# Patient Record
Sex: MALE | Race: WHITE | NOT HISPANIC OR LATINO | Employment: OTHER | URBAN - NONMETROPOLITAN AREA
[De-identification: names, ages, dates, MRNs, and addresses within clinical notes are randomized per-mention and may not be internally consistent; named-entity substitution may affect disease eponyms.]

---

## 2018-01-30 ENCOUNTER — TELEPHONE (OUTPATIENT)
Dept: PSYCHIATRY | Facility: CLINIC | Age: 48
End: 2018-01-30

## 2018-01-30 ENCOUNTER — OFFICE VISIT (OUTPATIENT)
Dept: PSYCHIATRY | Facility: CLINIC | Age: 48
End: 2018-01-30

## 2018-01-30 VITALS
SYSTOLIC BLOOD PRESSURE: 117 MMHG | HEIGHT: 70 IN | WEIGHT: 214 LBS | HEART RATE: 75 BPM | BODY MASS INDEX: 30.64 KG/M2 | DIASTOLIC BLOOD PRESSURE: 73 MMHG

## 2018-01-30 DIAGNOSIS — Z79.899 MEDICATION MANAGEMENT: ICD-10-CM

## 2018-01-30 DIAGNOSIS — F43.0 STRESS REACTION: ICD-10-CM

## 2018-01-30 DIAGNOSIS — F43.23 ADJUSTMENT DISORDER WITH MIXED ANXIETY AND DEPRESSED MOOD: Primary | ICD-10-CM

## 2018-01-30 LAB
AMPHETAMINE CUT-OFF: 1000
BENZODIAZIPINE CUT-OFF: 300
BUPRENORPHINE CUT-OFF: 10
COCAINE CUT-OFF: 300
EXTERNAL AMPHETAMINE SCREEN URINE: NEGATIVE
EXTERNAL BENZODIAZEPINE SCREEN URINE: POSITIVE
EXTERNAL BUPRENORPHINE SCREEN URINE: NEGATIVE
EXTERNAL COCAINE SCREEN URINE: NEGATIVE
EXTERNAL MDMA: NEGATIVE
EXTERNAL METHADONE SCREEN URINE: NEGATIVE
EXTERNAL METHAMPHETAMINE SCREEN URINE: NEGATIVE
EXTERNAL OPIATES SCREEN URINE: NEGATIVE
EXTERNAL OXYCODONE SCREEN URINE: NEGATIVE
EXTERNAL THC SCREEN URINE: NEGATIVE
MDMA CUT-OFF: 500
METHADONE CUT-OFF: 300
METHAMPHETAMINE CUT-OFF: 1000
OPIATES CUT-OFF: 300
OXYCODONE CUT-OFF: 100
THC CUT-OFF: 50

## 2018-01-30 PROCEDURE — 90792 PSYCH DIAG EVAL W/MED SRVCS: CPT | Performed by: NURSE PRACTITIONER

## 2018-01-30 RX ORDER — BUSPIRONE HYDROCHLORIDE 10 MG/1
10 TABLET ORAL
Qty: 60 TABLET | Refills: 0 | Status: SHIPPED | OUTPATIENT
Start: 2018-01-30 | End: 2020-02-03

## 2018-01-30 RX ORDER — TRAZODONE HYDROCHLORIDE 50 MG/1
50 TABLET ORAL NIGHTLY PRN
Qty: 30 TABLET | Refills: 1 | Status: SHIPPED | OUTPATIENT
Start: 2018-01-30 | End: 2020-02-03

## 2018-01-30 RX ORDER — HYDROXYZINE HYDROCHLORIDE 25 MG/1
25 TABLET, FILM COATED ORAL 3 TIMES DAILY PRN
Qty: 90 TABLET | Refills: 0 | Status: SHIPPED | OUTPATIENT
Start: 2018-01-30 | End: 2020-02-03

## 2018-01-30 RX ORDER — CITALOPRAM 20 MG/1
TABLET ORAL
Qty: 30 TABLET | Refills: 0 | Status: SHIPPED | OUTPATIENT
Start: 2018-01-30 | End: 2020-02-03

## 2018-01-30 RX ORDER — ESCITALOPRAM OXALATE 5 MG/1
5 TABLET ORAL DAILY
COMMUNITY
End: 2018-01-30
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
## 2020-02-03 ENCOUNTER — OFFICE VISIT (OUTPATIENT)
Dept: INTERNAL MEDICINE | Facility: CLINIC | Age: 50
End: 2020-02-03

## 2020-02-03 VITALS
WEIGHT: 207 LBS | BODY MASS INDEX: 30.66 KG/M2 | DIASTOLIC BLOOD PRESSURE: 52 MMHG | HEIGHT: 69 IN | HEART RATE: 72 BPM | SYSTOLIC BLOOD PRESSURE: 98 MMHG

## 2020-02-03 DIAGNOSIS — Z11.3 SCREEN FOR STD (SEXUALLY TRANSMITTED DISEASE): ICD-10-CM

## 2020-02-03 DIAGNOSIS — R10.13 EPIGASTRIC PAIN: Primary | ICD-10-CM

## 2020-02-03 DIAGNOSIS — F32.A ANXIETY AND DEPRESSION: ICD-10-CM

## 2020-02-03 DIAGNOSIS — R94.31 ABNORMAL EKG: ICD-10-CM

## 2020-02-03 DIAGNOSIS — F19.10 SUBSTANCE ABUSE (HCC): ICD-10-CM

## 2020-02-03 DIAGNOSIS — F41.9 ANXIETY AND DEPRESSION: ICD-10-CM

## 2020-02-03 DIAGNOSIS — Z13.220 LIPID SCREENING: ICD-10-CM

## 2020-02-03 DIAGNOSIS — Z13.29 ENCOUNTER FOR SCREENING FOR ENDOCRINE DISORDER: ICD-10-CM

## 2020-02-03 PROCEDURE — 99214 OFFICE O/P EST MOD 30 MIN: CPT | Performed by: NURSE PRACTITIONER

## 2020-02-03 PROCEDURE — 93000 ELECTROCARDIOGRAM COMPLETE: CPT | Performed by: NURSE PRACTITIONER

## 2020-02-03 NOTE — PROGRESS NOTES
Saud Limon  1970  [id number]  Patient Care Team:  Ban Vance APRN as PCP - General (Internal Medicine)    Saud Limon is a 49 y.o. here today to establish care.     Chief Complaint   Patient presents with   • Establish Care       HPI:   Saud is here to establish care.  Reports no pcp for 2-3 years now secondary to lack of insurance.   His primary concern is epigastric pain.  Pain is persistent lasting the past few years, worse after eating fried foods and large amounts at one time.  Sometimes with vomiting.  Uses baking soda in water sometimes.  Has night sweats at times, no otc antacids.    [place] 2017    Also wants to address anxiety and depression.  Worried about decreased concentration and focus.    He denies current illicit drug use but has been using narcotics (percocet) and benzos (xanax) intermittently for years.  Rarely uses etoh.  Reports he went through divorce several yrs ago and separation from his teenage children has been difficult.  Last cocaine use 6 mo ago.    Years ago he was given xanax and at one time around 2008 was prescribed 120 per mo.  He reports this was difficult to taper down from but was eventually successful.     Currently using percocet avg 2 per day, usually 10 mg but sometimes 20mg for the past 4 mo.    He would like to update labs and check for STDs.  Past Medical History:   Diagnosis Date   • Anxiety    • Depression    • Insomnia      Past Surgical History:   Procedure Laterality Date   • NO PAST SURGERIES       Family History   Family history unknown: Yes     Social History     Tobacco Use   Smoking Status Light Tobacco Smoker   Smokeless Tobacco Former User   Tobacco Comment    Patient smokes 2-3 cigarettes per day on occasion     No Known Allergies  No current outpatient medications on file.    Review of Systems   Constitutional: Positive for fatigue. Negative for chills and fever.   HENT: Negative for congestion, ear pain and sinus pressure.    Respiratory: Positive  "for wheezing. Negative for cough, chest tightness and shortness of breath.    Cardiovascular: Positive for chest pain. Negative for palpitations.   Gastrointestinal: Positive for abdominal pain. Negative for blood in stool and constipation.   Skin: Negative for color change.   Allergic/Immunologic: Negative for environmental allergies.   Neurological: Negative for dizziness, speech difficulty and headache.   Psychiatric/Behavioral: Positive for decreased concentration. The patient is nervous/anxious.        BP 98/52 (BP Location: Left arm, Patient Position: Sitting, Cuff Size: Large Adult)   Pulse 72   Ht 176.5 cm (69.49\")   Wt 93.9 kg (207 lb)   BMI 30.14 kg/m²     Physical Exam   Constitutional: He appears well-developed and well-nourished.   HENT:   Head: Normocephalic and atraumatic.   Right Ear: External ear normal.   Left Ear: External ear normal.   Mouth/Throat: Oropharynx is clear and moist.   Eyes: Conjunctivae and EOM are normal.   Neck: Normal range of motion. Neck supple.   Cardiovascular: Normal rate, regular rhythm and normal heart sounds.   Pulmonary/Chest: Effort normal and breath sounds normal.   Abdominal: Soft. Bowel sounds are normal. There is tenderness in the epigastric area.   Musculoskeletal: Normal range of motion.   Neurological: He is alert.   Skin: Skin is warm and dry.   Psychiatric: He has a normal mood and affect. His behavior is normal. Thought content normal.         ECG 12 Lead  Date/Time: 2/3/2020 12:58 PM  Performed by: Ban Vance APRN  Authorized by: Ban Vance APRN   Previous ECG: no previous ECG available  Rhythm: sinus rhythm  BPM: 67  Conduction: non-specific intraventricular conduction delay    Clinical impression: abnormal EKG  Comments: HX of substance abuse including cocaine            Results Review:  I reviewed the patient's new clinical results.    Assessment/Plan:  Patient Instructions   Problem List Items Addressed This Visit     None      Visit " Diagnoses     Epigastric pain    -  Primary    EKG today, will get labs    Relevant Orders    Hepatitis C Antibody    ECG 12 Lead    Substance abuse (CMS/HCC)        percocet, hx of xanax and cocaine    Relevant Orders    CBC & Differential    Urine Drug Screen - Urine, Clean Catch (Completed)    Vitamin B12    Lipid screening        Relevant Orders    Lipid Panel    Comprehensive Metabolic Panel    Encounter for screening for endocrine disorder        Relevant Orders    TSH Rfx On Abnormal To Free T4    Screen for STD (sexually transmitted disease)        Relevant Orders    HIV-1 & HIV-2 Antibodies    RPR    Hepatitis C Antibody    Chlamydia trachomatis, Neisseria gonorrhoeae, PCR - Urine, Urine, Clean Catch             Diagnosis Plan   1. Epigastric pain  Hepatitis C Antibody    ECG 12 Lead    EKG today, will get labs   2. Substance abuse (CMS/HCC)  CBC & Differential    Urine Drug Screen - Urine, Clean Catch    Vitamin B12    percocet, hx of xanax and cocaine   3. Lipid screening  Lipid Panel    Comprehensive Metabolic Panel   4. Encounter for screening for endocrine disorder  TSH Rfx On Abnormal To Free T4   5. Screen for STD (sexually transmitted disease)  HIV-1 & HIV-2 Antibodies    RPR    Hepatitis C Antibody    Chlamydia trachomatis, Neisseria gonorrhoeae, PCR - Urine, Urine, Clean Catch       Patient Instructions   Labs today.      Will need referral to mental health provider for current narcotic dependence.  He agrees to check with insurance for options.  Suggest [facility] if they accept insurance.    Will refer to cardiology for evaluation of abnormal EKG and history of cocaine abuse.          Plan of care reviewed with patient at the conclusion of today's visit. Education was provided regarding diagnosis and management.  Patient verbalizes understanding of and agreement with management plan.    Return in about 2 weeks (around 2/17/2020).    * Please note that portions of this note were  completed with a voice recognition program. Efforts were made to edit the dictation but occasionally words are transcribed.     Ban Vance, APRN

## 2020-02-04 ENCOUNTER — LAB (OUTPATIENT)
Dept: LAB | Facility: HOSPITAL | Age: 50
End: 2020-02-04

## 2020-02-04 DIAGNOSIS — R10.13 EPIGASTRIC PAIN: ICD-10-CM

## 2020-02-04 DIAGNOSIS — Z11.3 SCREEN FOR STD (SEXUALLY TRANSMITTED DISEASE): ICD-10-CM

## 2020-02-04 DIAGNOSIS — Z13.220 LIPID SCREENING: ICD-10-CM

## 2020-02-04 DIAGNOSIS — F19.10 SUBSTANCE ABUSE (HCC): ICD-10-CM

## 2020-02-04 DIAGNOSIS — Z13.29 ENCOUNTER FOR SCREENING FOR ENDOCRINE DISORDER: ICD-10-CM

## 2020-02-04 LAB
ALBUMIN SERPL-MCNC: 4.7 G/DL (ref 3.5–5.2)
ALBUMIN/GLOB SERPL: 2.1 G/DL
ALP SERPL-CCNC: 75 U/L (ref 39–117)
ALT SERPL W P-5'-P-CCNC: 19 U/L (ref 1–41)
AMPHET+METHAMPHET UR QL: NEGATIVE
AMPHETAMINES UR QL: NEGATIVE
ANION GAP SERPL CALCULATED.3IONS-SCNC: 13 MMOL/L (ref 5–15)
AST SERPL-CCNC: 18 U/L (ref 1–40)
BARBITURATES UR QL SCN: NEGATIVE
BASOPHILS # BLD AUTO: 0.06 10*3/MM3 (ref 0–0.2)
BASOPHILS NFR BLD AUTO: 0.9 % (ref 0–1.5)
BENZODIAZ UR QL SCN: NEGATIVE
BILIRUB SERPL-MCNC: 0.7 MG/DL (ref 0.2–1.2)
BUN BLD-MCNC: 15 MG/DL (ref 6–20)
BUN/CREAT SERPL: 15 (ref 7–25)
BUPRENORPHINE SERPL-MCNC: NEGATIVE NG/ML
CALCIUM SPEC-SCNC: 9.5 MG/DL (ref 8.6–10.5)
CANNABINOIDS SERPL QL: NEGATIVE
CHLORIDE SERPL-SCNC: 101 MMOL/L (ref 98–107)
CHOLEST SERPL-MCNC: 173 MG/DL (ref 0–200)
CO2 SERPL-SCNC: 25 MMOL/L (ref 22–29)
COCAINE UR QL: POSITIVE
CREAT BLD-MCNC: 1 MG/DL (ref 0.76–1.27)
DEPRECATED RDW RBC AUTO: 39.7 FL (ref 37–54)
EOSINOPHIL # BLD AUTO: 0.19 10*3/MM3 (ref 0–0.4)
EOSINOPHIL NFR BLD AUTO: 2.8 % (ref 0.3–6.2)
ERYTHROCYTE [DISTWIDTH] IN BLOOD BY AUTOMATED COUNT: 12 % (ref 12.3–15.4)
GFR SERPL CREATININE-BSD FRML MDRD: 80 ML/MIN/1.73
GLOBULIN UR ELPH-MCNC: 2.2 GM/DL
GLUCOSE BLD-MCNC: 104 MG/DL (ref 65–99)
HCT VFR BLD AUTO: 44 % (ref 37.5–51)
HCV AB SER DONR QL: NORMAL
HDLC SERPL-MCNC: 42 MG/DL (ref 40–60)
HGB BLD-MCNC: 15.3 G/DL (ref 13–17.7)
HIV1+2 AB SER QL: NORMAL
IMM GRANULOCYTES # BLD AUTO: 0.02 10*3/MM3 (ref 0–0.05)
IMM GRANULOCYTES NFR BLD AUTO: 0.3 % (ref 0–0.5)
LDLC SERPL CALC-MCNC: 105 MG/DL (ref 0–100)
LDLC/HDLC SERPL: 2.49 {RATIO}
LYMPHOCYTES # BLD AUTO: 2.1 10*3/MM3 (ref 0.7–3.1)
LYMPHOCYTES NFR BLD AUTO: 30.7 % (ref 19.6–45.3)
MCH RBC QN AUTO: 31.7 PG (ref 26.6–33)
MCHC RBC AUTO-ENTMCNC: 34.8 G/DL (ref 31.5–35.7)
MCV RBC AUTO: 91.1 FL (ref 79–97)
METHADONE UR QL SCN: NEGATIVE
MONOCYTES # BLD AUTO: 0.49 10*3/MM3 (ref 0.1–0.9)
MONOCYTES NFR BLD AUTO: 7.2 % (ref 5–12)
NEUTROPHILS # BLD AUTO: 3.99 10*3/MM3 (ref 1.7–7)
NEUTROPHILS NFR BLD AUTO: 58.1 % (ref 42.7–76)
NRBC BLD AUTO-RTO: 0 /100 WBC (ref 0–0.2)
OPIATES UR QL: NEGATIVE
OXYCODONE UR QL SCN: POSITIVE
PCP UR QL SCN: NEGATIVE
PLATELET # BLD AUTO: 210 10*3/MM3 (ref 140–450)
PMV BLD AUTO: 10.8 FL (ref 6–12)
POTASSIUM BLD-SCNC: 4.6 MMOL/L (ref 3.5–5.2)
PROPOXYPH UR QL: NEGATIVE
PROT SERPL-MCNC: 6.9 G/DL (ref 6–8.5)
RBC # BLD AUTO: 4.83 10*6/MM3 (ref 4.14–5.8)
SODIUM BLD-SCNC: 139 MMOL/L (ref 136–145)
TRICYCLICS UR QL SCN: NEGATIVE
TRIGL SERPL-MCNC: 132 MG/DL (ref 0–150)
TSH SERPL DL<=0.05 MIU/L-ACNC: 1.57 UIU/ML (ref 0.27–4.2)
VIT B12 BLD-MCNC: 466 PG/ML (ref 211–946)
VLDLC SERPL-MCNC: 26.4 MG/DL (ref 5–40)
WBC NRBC COR # BLD: 6.85 10*3/MM3 (ref 3.4–10.8)

## 2020-02-04 PROCEDURE — 80061 LIPID PANEL: CPT

## 2020-02-04 PROCEDURE — 80306 DRUG TEST PRSMV INSTRMNT: CPT

## 2020-02-04 PROCEDURE — 87591 N.GONORRHOEAE DNA AMP PROB: CPT

## 2020-02-04 PROCEDURE — 82607 VITAMIN B-12: CPT

## 2020-02-04 PROCEDURE — G0432 EIA HIV-1/HIV-2 SCREEN: HCPCS

## 2020-02-04 PROCEDURE — 85025 COMPLETE CBC W/AUTO DIFF WBC: CPT

## 2020-02-04 PROCEDURE — 80053 COMPREHEN METABOLIC PANEL: CPT

## 2020-02-04 PROCEDURE — 86592 SYPHILIS TEST NON-TREP QUAL: CPT

## 2020-02-04 PROCEDURE — 87491 CHLMYD TRACH DNA AMP PROBE: CPT

## 2020-02-04 PROCEDURE — 86803 HEPATITIS C AB TEST: CPT

## 2020-02-04 PROCEDURE — 84443 ASSAY THYROID STIM HORMONE: CPT

## 2020-02-05 LAB — RPR SER QL: NORMAL

## 2020-02-05 NOTE — PATIENT INSTRUCTIONS
Labs today.      Will need referral to mental health provider for current narcotic dependence.  He agrees to check with insurance for options.  Suggest [facility] if they accept insurance.    Will refer to cardiology for evaluation of abnormal EKG and history of cocaine abuse.

## 2020-02-05 NOTE — PROGRESS NOTES
"[facility]  [facility]      02/06/2020       Saud SARAH [id number]  [unfilled]    1970    Ban Vance APRN    Saud Limon is a 49 y.o. male.      Subjective:     Chief Complaint:  Establish Care- abnormal EKG       HPI   49-year-old male with no significant past medical history who presents to the [facility] for evaluation of abnormal EKG at the request of JONO Sheldon.  Patient reports that he is not had any medical care for the past 2 or 3 years secondary to lack of insurance.  He recently established care with JONO Sheldon and complained of epigastric pain over the past few years.  He occasionally has some associated vomiting.  Pain is worse after eating fried foods and large amounts of food.  He had an EKG done in her office which showed left axis deviation, moderate intraventricular conduction delay and possible LVH.  He does have a history of cocaine use, last use about a year. He reports waking up with panic attacks and heart pounding and then chest soreness. He has been exercising, boxing, and notes feeling some \"numb and cold\" sensation in his chest. He notes constant chest pressure at rest that is worse when he breaths out. He currently has chest pressure. He does work a strenuous job and he can do this without symptoms at time. He admits that anxiety is major contributor    Cardiac risks: Male gender, tobacco abuse        Past Medical History:   Diagnosis Date   • Anxiety    • Depression    • Insomnia        Past Surgical History:   Procedure Laterality Date   • CHOLECYSTECTOMY     • NO PAST SURGERIES         Family History   Problem Relation Age of Onset   • No Known Problems Mother    • No Known Problems Father    • No Known Problems Sister        Social History     Socioeconomic History   • Marital status: Other     Spouse name: Not on file   • Number of children: Not on file   • Years of education: Not " on file   • Highest education level: Not on file   Tobacco Use   • Smoking status: Light Tobacco Smoker   • Smokeless tobacco: Former User   • Tobacco comment: Patient smokes 2-3 cigarettes per day on occasion   Substance and Sexual Activity   • Alcohol use: No   • Drug use: No   • Sexual activity: Not Currently   Social History Narrative    Caffeine 1 serving of coffee       No Known Allergies      Current Outpatient Medications:   •  busPIRone (BUSPAR) 10 MG tablet, Take 10 mg by mouth 3 (Three) Times a Day., Disp: , Rfl:     The following portions of the patient's history were reviewed today and updated as appropriate: allergies, current medications, past family history, past medical history, past social history, past surgical history and problem list     Review of Systems   Constitution: Negative for chills and fever.   HENT: Negative.    Eyes: Negative.    Cardiovascular: Positive for chest pain and dyspnea on exertion. Negative for claudication, cyanosis, irregular heartbeat, leg swelling, near-syncope, orthopnea, palpitations, paroxysmal nocturnal dyspnea and syncope.   Respiratory: Negative for cough, shortness of breath and snoring.    Endocrine: Negative.    Hematologic/Lymphatic: Does not bruise/bleed easily.   Skin: Negative for poor wound healing.   Musculoskeletal: Negative.    Gastrointestinal: Negative for abdominal pain, heartburn, hematemesis, melena, nausea and vomiting.   Genitourinary: Negative.  Negative for hematuria.   Neurological: Negative.    Psychiatric/Behavioral: The patient is nervous/anxious.    Allergic/Immunologic: Negative.        Objective:     Vitals:    02/06/20 1110 02/06/20 1111 02/06/20 1112   BP: 101/63 111/72 110/68   BP Location: Right arm Left arm Left arm   Patient Position: Sitting Standing Sitting   Cuff Size: Adult Adult Adult   Pulse: 77 84 75   Resp:   18   Temp:   98 °F (36.7 °C)   TempSrc:   Temporal   SpO2: 96% 96% 98%   Weight:   95.4 kg (210 lb 6 oz)   Height:   " 176.5 cm (69.49\")       Body mass index is 30.63 kg/m².    Physical Exam   Constitutional: He is oriented to person, place, and time. He appears well-developed and well-nourished. No distress.   HENT:   Head: Normocephalic.   Eyes: Pupils are equal, round, and reactive to light. Conjunctivae are normal.   Neck: Neck supple. No JVD present. No thyromegaly present.   Cardiovascular: Normal rate, regular rhythm, normal heart sounds and intact distal pulses. Exam reveals no gallop and no friction rub.   No murmur heard.  Pulmonary/Chest: Effort normal and breath sounds normal. No respiratory distress. He has no wheezes. He has no rales. He exhibits no tenderness.   Abdominal: Soft. Bowel sounds are normal.   Musculoskeletal: Normal range of motion. He exhibits no edema.   Neurological: He is alert and oriented to person, place, and time.   Skin: Skin is warm and dry.   Psychiatric: He has a normal mood and affect. His behavior is normal. Thought content normal.   Vitals reviewed.      Lab and Diagnostic Review:  Results for orders placed or performed in visit on 02/04/20   Lipid Panel   Result Value Ref Range    Total Cholesterol 173 0 - 200 mg/dL    Triglycerides 132 0 - 150 mg/dL    HDL Cholesterol 42 40 - 60 mg/dL    LDL Cholesterol  105 (H) 0 - 100 mg/dL    VLDL Cholesterol 26.4 5 - 40 mg/dL    LDL/HDL Ratio 2.49    Comprehensive Metabolic Panel   Result Value Ref Range    Glucose 104 (H) 65 - 99 mg/dL    BUN 15 6 - 20 mg/dL    Creatinine 1.00 0.76 - 1.27 mg/dL    Sodium 139 136 - 145 mmol/L    Potassium 4.6 3.5 - 5.2 mmol/L    Chloride 101 98 - 107 mmol/L    CO2 25.0 22.0 - 29.0 mmol/L    Calcium 9.5 8.6 - 10.5 mg/dL    Total Protein 6.9 6.0 - 8.5 g/dL    Albumin 4.70 3.50 - 5.20 g/dL    ALT (SGPT) 19 1 - 41 U/L    AST (SGOT) 18 1 - 40 U/L    Alkaline Phosphatase 75 39 - 117 U/L    Total Bilirubin 0.7 0.2 - 1.2 mg/dL    eGFR Non African Amer 80 >60 mL/min/1.73    Globulin 2.2 gm/dL    A/G Ratio 2.1 g/dL    " BUN/Creatinine Ratio 15.0 7.0 - 25.0    Anion Gap 13.0 5.0 - 15.0 mmol/L   TSH Rfx On Abnormal To Free T4   Result Value Ref Range    TSH 1.570 0.270 - 4.200 uIU/mL   Urine Drug Screen - Urine, Clean Catch   Result Value Ref Range    THC, Screen, Urine Negative Negative    Phencyclidine (PCP), Urine Negative Negative    Cocaine Screen, Urine Positive (A) Negative    Methamphetamine, Ur Negative Negative    Opiate Screen Negative Negative    Amphetamine Screen, Urine Negative Negative    Benzodiazepine Screen, Urine Negative Negative    Tricyclic Antidepressants Screen Negative Negative    Methadone Screen, Urine Negative Negative    Barbiturates Screen, Urine Negative Negative    Oxycodone Screen, Urine Positive (A) Negative    Propoxyphene Screen Negative Negative    Buprenorphine, Screen, Urine Negative Negative   Vitamin B12   Result Value Ref Range    Vitamin B-12 466 211 - 946 pg/mL   RPR   Result Value Ref Range    RPR Non-Reactive Non-Reactive   Hepatitis C Antibody   Result Value Ref Range    Hepatitis C Ab Non-Reactive Non-Reactive   CBC Auto Differential   Result Value Ref Range    WBC 6.85 3.40 - 10.80 10*3/mm3    RBC 4.83 4.14 - 5.80 10*6/mm3    Hemoglobin 15.3 13.0 - 17.7 g/dL    Hematocrit 44.0 37.5 - 51.0 %    MCV 91.1 79.0 - 97.0 fL    MCH 31.7 26.6 - 33.0 pg    MCHC 34.8 31.5 - 35.7 g/dL    RDW 12.0 (L) 12.3 - 15.4 %    RDW-SD 39.7 37.0 - 54.0 fl    MPV 10.8 6.0 - 12.0 fL    Platelets 210 140 - 450 10*3/mm3    Neutrophil % 58.1 42.7 - 76.0 %    Lymphocyte % 30.7 19.6 - 45.3 %    Monocyte % 7.2 5.0 - 12.0 %    Eosinophil % 2.8 0.3 - 6.2 %    Basophil % 0.9 0.0 - 1.5 %    Immature Grans % 0.3 0.0 - 0.5 %    Neutrophils, Absolute 3.99 1.70 - 7.00 10*3/mm3    Lymphocytes, Absolute 2.10 0.70 - 3.10 10*3/mm3    Monocytes, Absolute 0.49 0.10 - 0.90 10*3/mm3    Eosinophils, Absolute 0.19 0.00 - 0.40 10*3/mm3    Basophils, Absolute 0.06 0.00 - 0.20 10*3/mm3    Immature Grans, Absolute 0.02 0.00 - 0.05  10*3/mm3    nRBC 0.0 0.0 - 0.2 /100 WBC   HIV-1 / O / 2 Ag / Antibody 4th Generation   Result Value Ref Range    HIV-1/ HIV-2 Non-Reactive Non-Reactive     EKG 2/3/20 NSR, marked LAD, moderate intraventricular conduction delay, possible LVH    Assessment and Plan:   1. Chest pain, unspecified type  Some atypical symptoms but borderline EKG and exertional symptoms  Advised to not exercise until stress test results  Smoking and cocaine cessation encouraged  - Treadmill Stress Test; Future    2. Abnormal EKG  - Treadmill Stress Test; Future  - Adult Transthoracic Echo Complete W/ Cont if Necessary Per Protocol; Future    3. BRANDON (dyspnea on exertion)  - Treadmill Stress Test; Future  - Adult Transthoracic Echo Complete W/ Cont if Necessary Per Protocol; Future    Further plans pending testing      It has been a pleasure to participate in the care of this patient.  Patient was instructed to call the [facility] with any questions, concerns, or worsening symptoms.    *Please note that portions of this note were completed with a voice recognition program. Efforts were made to edit the dictations, but occasionally words are mistranscribed.

## 2020-02-06 ENCOUNTER — HOSPITAL ENCOUNTER (OUTPATIENT)
Dept: CARDIOLOGY | Facility: HOSPITAL | Age: 50
Discharge: HOME | End: 2020-02-06
Admitting: NURSE PRACTITIONER

## 2020-02-06 ENCOUNTER — OFFICE VISIT (OUTPATIENT)
Dept: CARDIOLOGY | Facility: HOSPITAL | Age: 50
End: 2020-02-06

## 2020-02-06 VITALS
SYSTOLIC BLOOD PRESSURE: 110 MMHG | TEMPERATURE: 98 F | BODY MASS INDEX: 31.16 KG/M2 | WEIGHT: 210.38 LBS | RESPIRATION RATE: 18 BRPM | OXYGEN SATURATION: 98 % | HEIGHT: 69 IN | DIASTOLIC BLOOD PRESSURE: 68 MMHG | HEART RATE: 75 BPM

## 2020-02-06 DIAGNOSIS — R07.9 CHEST PAIN, UNSPECIFIED TYPE: Primary | ICD-10-CM

## 2020-02-06 DIAGNOSIS — R94.31 ABNORMAL EKG: ICD-10-CM

## 2020-02-06 DIAGNOSIS — R06.09 DOE (DYSPNEA ON EXERTION): ICD-10-CM

## 2020-02-06 DIAGNOSIS — R07.9 CHEST PAIN, UNSPECIFIED TYPE: ICD-10-CM

## 2020-02-06 LAB
BH CV STRESS BP STAGE 1: NORMAL
BH CV STRESS BP STAGE 2: NORMAL
BH CV STRESS BP STAGE 3: NORMAL
BH CV STRESS DURATION MIN STAGE 1: 3
BH CV STRESS DURATION MIN STAGE 2: 3
BH CV STRESS DURATION MIN STAGE 3: 3
BH CV STRESS DURATION SEC STAGE 1: 0
BH CV STRESS DURATION SEC STAGE 2: 0
BH CV STRESS DURATION SEC STAGE 3: 0
BH CV STRESS DURATION SEC STAGE 4: 30
BH CV STRESS GRADE STAGE 1: 10
BH CV STRESS GRADE STAGE 2: 12
BH CV STRESS GRADE STAGE 3: 14
BH CV STRESS GRADE STAGE 4: 16
BH CV STRESS HR STAGE 1: 115
BH CV STRESS HR STAGE 2: 129
BH CV STRESS HR STAGE 3: 147
BH CV STRESS HR STAGE 4: 155
BH CV STRESS METS STAGE 1: 5
BH CV STRESS METS STAGE 2: 7.5
BH CV STRESS METS STAGE 3: 10
BH CV STRESS METS STAGE 4: 13.5
BH CV STRESS PROTOCOL 1: NORMAL
BH CV STRESS RECOVERY BP: NORMAL MMHG
BH CV STRESS RECOVERY HR: 102 BPM
BH CV STRESS SPEED STAGE 1: 1.7
BH CV STRESS SPEED STAGE 2: 2.5
BH CV STRESS SPEED STAGE 3: 3.4
BH CV STRESS SPEED STAGE 4: 4.2
BH CV STRESS STAGE 1: 1
BH CV STRESS STAGE 2: 2
BH CV STRESS STAGE 3: 3
BH CV STRESS STAGE 4: 4
MAXIMAL PREDICTED HEART RATE: 173 BPM
PERCENT MAX PREDICTED HR: 89.6 %
STRESS BASELINE BP: NORMAL MMHG
STRESS BASELINE HR: 85 BPM
STRESS PERCENT HR: 105 %
STRESS POST ESTIMATED WORKLOAD: 10.8 METS
STRESS POST EXERCISE DUR MIN: 9 MIN
STRESS POST EXERCISE DUR SEC: 30 SEC
STRESS POST PEAK BP: NORMAL MMHG
STRESS POST PEAK HR: 155 BPM
STRESS TARGET HR: 147 BPM

## 2020-02-06 PROCEDURE — 93017 CV STRESS TEST TRACING ONLY: CPT

## 2020-02-06 PROCEDURE — 99214 OFFICE O/P EST MOD 30 MIN: CPT | Performed by: NURSE PRACTITIONER

## 2020-02-06 PROCEDURE — 93018 CV STRESS TEST I&R ONLY: CPT | Performed by: INTERNAL MEDICINE

## 2020-02-06 RX ORDER — BUSPIRONE HYDROCHLORIDE 10 MG/1
10 TABLET ORAL 3 TIMES DAILY
COMMUNITY
End: 2020-02-17

## 2020-02-07 LAB
C TRACH RRNA SPEC DONR QL NAA+PROBE: NEGATIVE
N GONORRHOEA DNA SPEC QL NAA+PROBE: NEGATIVE

## 2020-02-17 ENCOUNTER — OFFICE VISIT (OUTPATIENT)
Dept: INTERNAL MEDICINE | Facility: CLINIC | Age: 50
End: 2020-02-17

## 2020-02-17 VITALS
SYSTOLIC BLOOD PRESSURE: 118 MMHG | DIASTOLIC BLOOD PRESSURE: 80 MMHG | HEART RATE: 76 BPM | HEIGHT: 69 IN | WEIGHT: 211 LBS | BODY MASS INDEX: 31.25 KG/M2

## 2020-02-17 DIAGNOSIS — R10.13 EPIGASTRIC PAIN: Primary | ICD-10-CM

## 2020-02-17 DIAGNOSIS — F11.20 NARCOTIC ADDICTION (HCC): ICD-10-CM

## 2020-02-17 DIAGNOSIS — F14.90 COCAINE USE: ICD-10-CM

## 2020-02-17 DIAGNOSIS — F19.10 SUBSTANCE ABUSE (HCC): ICD-10-CM

## 2020-02-17 PROCEDURE — 99214 OFFICE O/P EST MOD 30 MIN: CPT | Performed by: NURSE PRACTITIONER

## 2020-02-17 NOTE — PATIENT INSTRUCTIONS
Keep appointment with behavioral health this Wednesday as planned.    Will need to schedule echo as suggested by cardiology.    Avoid narcotics and illicit drugs including cocaine.    Great job decreasing cigarette smoking.

## 2020-02-17 NOTE — PROGRESS NOTES
Saud Limon  1970  [id number]  Patient Care Team:  Ban Vance APRN as PCP - General (Internal Medicine)    Saud Limon is a pleasant 49 y.o. male who presents for evaluation of Abdominal Pain (3 week follow up )      Chief Complaint   Patient presents with   • Abdominal Pain     3 week follow up        HPI:   Patient had his initial visit with me February 3 to establish care.  He had been without a PCP for many years.  Struggling with a multi-substance addiction including Percocet and cocaine.  He is also been a light cigarette smoker for many years.  He reports that since his last visit 2 weeks ago he has had less narcotics.  Has used 4 Percocets and 2 Lortabs.  Average use was 2 to 4/day prior.  Has used cocaine twice.  He did not get his lab letter so we reviewed that today during office visit.  He does have a behavioral health visit scheduled for this Wednesday.  He did follow-up with cardiology had a negative stress test and pending echo.  The chest wall pain he was having has improved.  He continues to have some abdominal pain.  No fever.  I will hold off on prescribing medications for mental health disorder since he has a pending appointment.  He endorses motivation to get clean.  Past Medical History:   Diagnosis Date   • Anxiety    • Depression    • Insomnia      Past Surgical History:   Procedure Laterality Date   • CHOLECYSTECTOMY     • NO PAST SURGERIES       Family History   Problem Relation Age of Onset   • No Known Problems Mother    • No Known Problems Father    • No Known Problems Sister      Social History     Tobacco Use   Smoking Status Light Tobacco Smoker   Smokeless Tobacco Former User   Tobacco Comment    Patient smokes 2-3 cigarettes per day on occasion     No Known Allergies  No current outpatient medications on file.    Review of Systems   Constitutional: Negative for chills, fatigue and fever.   HENT: Negative for congestion, ear pain and sinus pressure.   "  Respiratory: Negative for cough, chest tightness, shortness of breath and wheezing.    Cardiovascular: Negative for chest pain and palpitations.   Gastrointestinal: Negative for abdominal pain, blood in stool and constipation.   Skin: Negative for color change.   Allergic/Immunologic: Negative for environmental allergies.   Neurological: Negative for dizziness, speech difficulty and headache.   Psychiatric/Behavioral: Negative for decreased concentration. The patient is not nervous/anxious.      /80 (BP Location: Left arm, Patient Position: Sitting, Cuff Size: Adult)   Pulse 76   Ht 176.5 cm (69.49\")   Wt 95.7 kg (211 lb)   BMI 30.72 kg/m²     Physical Exam   Constitutional: He appears well-developed and well-nourished.   HENT:   Head: Normocephalic and atraumatic.   Right Ear: External ear normal.   Left Ear: External ear normal.   Mouth/Throat: Oropharynx is clear and moist.   Eyes: Conjunctivae and EOM are normal.   Neck: Normal range of motion. Neck supple.   Cardiovascular: Normal rate, regular rhythm and normal heart sounds.   Pulmonary/Chest: Effort normal and breath sounds normal.   Abdominal: Soft. Bowel sounds are normal.   Musculoskeletal: Normal range of motion.   Neurological: He is alert.   Skin: Skin is warm and dry.   Psychiatric: He has a normal mood and affect. His behavior is normal. Thought content normal.       Results Review:  I reviewed the patient's new clinical results.    Assessment/Plan:  Saud was seen today for abdominal pain.    Diagnoses and all orders for this visit:    Epigastric pain  Comments:  cardiac work up in progress.  needs to abstain from drug abuse    Substance abuse (CMS/[facility])  Comments:  seeing behavioral health wed    Cocaine use    Narcotic addiction (CMS/[facility])       Patient Instructions   Keep appointment with behavioral health this Wednesday as planned.    Will need to schedule echo as suggested by cardiology.    Avoid narcotics and illicit drugs including " cocaine.    Great job decreasing cigarette smoking.      Plan of care reviewed with patient at the conclusion of today's visit. Education was provided regarding diagnosis, management and any prescribed or recommended OTC medications.  Patient verbalizes understanding of and agreement with management plan.    Return in about 1 month (around 3/17/2020).    *Note that portions of this note were completed with a voice recognition program.  Efforts were made to edit the dictation but occasionally words are transcribed.    Ban Vance, JONO

## 2020-03-09 ENCOUNTER — TELEPHONE (OUTPATIENT)
Dept: INTERNAL MEDICINE | Facility: CLINIC | Age: 50
End: 2020-03-09

## 2020-03-09 NOTE — TELEPHONE ENCOUNTER
Patient verbalized understanding.     He would like to be tested. Patient has had some vomiting within the last week. Not everyday. Patient did not complain of any other symptoms.

## 2020-03-09 NOTE — TELEPHONE ENCOUNTER
PT CALLED IN STATED HIS GIRLFRIEND WAS RECENTLY DIAGNOSED WITH HPOLORI AND  WOULD LIKE TO KNOW IN HIS RECENT BLOOD WORK IF HE MAY HAVE GOTTEN TESTED FOR THIS AS WELL PLEASE CALL TO ADVISE        PT CB NUMBER: [phone]

## 2020-03-30 ENCOUNTER — NURSE TRIAGE (OUTPATIENT)
Dept: CALL CENTER | Facility: HOSPITAL | Age: 50
End: 2020-03-30

## 2020-03-30 NOTE — TELEPHONE ENCOUNTER
"    Reason for Disposition  • Information only question and nurse able to answer    Additional Information  • Negative: Nursing judgment  • Negative: Nursing judgment  • Negative: Nursing judgment  • Negative: Nursing judgment    Answer Assessment - Initial Assessment Questions  1. REASON FOR CALL or QUESTION: \"What is your reason for calling today?\" or \"How can I best help you?\" or \"What question do you have that I can help answer?\"      Caller has no specific symptoms and was wanting to be tested for COVID so he would know. Caller instructed he does not meet criteria for testing. He was accepting of that and will follow social distancing and all protocols.    Protocols used: INFORMATION ONLY CALL - NO TRIAGE-ADULT-[place]      "

## 2021-01-27 ENCOUNTER — OFFICE VISIT (OUTPATIENT)
Dept: INTERNAL MEDICINE | Facility: CLINIC | Age: 51
End: 2021-01-27

## 2021-01-27 VITALS
DIASTOLIC BLOOD PRESSURE: 68 MMHG | HEIGHT: 70 IN | TEMPERATURE: 97.1 F | BODY MASS INDEX: 30.64 KG/M2 | WEIGHT: 214 LBS | HEART RATE: 72 BPM | SYSTOLIC BLOOD PRESSURE: 112 MMHG

## 2021-01-27 DIAGNOSIS — F19.10 SUBSTANCE ABUSE (HCC): ICD-10-CM

## 2021-01-27 DIAGNOSIS — R53.82 CHRONIC FATIGUE: ICD-10-CM

## 2021-01-27 DIAGNOSIS — R07.89 CHEST DISCOMFORT: Primary | ICD-10-CM

## 2021-01-27 PROCEDURE — 93000 ELECTROCARDIOGRAM COMPLETE: CPT | Performed by: NURSE PRACTITIONER

## 2021-01-27 PROCEDURE — 99214 OFFICE O/P EST MOD 30 MIN: CPT | Performed by: NURSE PRACTITIONER

## 2021-01-27 PROCEDURE — 90471 IMMUNIZATION ADMIN: CPT | Performed by: NURSE PRACTITIONER

## 2021-01-27 PROCEDURE — 90686 IIV4 VACC NO PRSV 0.5 ML IM: CPT | Performed by: NURSE PRACTITIONER

## 2021-01-27 NOTE — PROGRESS NOTES
Called pt back and let him know that the message he was given was incorrect and he needs to return my call.

## 2021-01-27 NOTE — PROGRESS NOTES
"Saud Limon  1970  [id number]  Patient Care Team:  Ban Vance APRN as PCP - General (Internal Medicine)    Saud Limon is a pleasant 50 y.o. male who presents for evaluation of Heart Problem (Patient would like to follow through with echo) and Labs Only (Would like to check testosterone )      Chief Complaint   Patient presents with   • Heart Problem     Patient would like to follow through with echo   • Labs Only     Would like to check testosterone        HPI:   Substance abuse disorder:  contiued to use cocaine last yr on multiple occassions to cope with stress and fatigue.  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  Last use a few weeks ago.  Stopped narcotics last summer.      Anxiety:  Followed by behavioral health Casandra Guillory.  Xanax 1mg TID and prozac until stopping cold turkey 2 wks ago.  \"just don't want to be on medications\".  Reports side effects of fatigue and wt gain.  Has some sx of heart racing when we lays down at night, some sob worse at night and with activity.  Never followed up last yr for echo after cardiology eval, he would like to now.  No formal exercise until 2 wks ago did 2 boxing classes, had sob he attributes to deconditioning,  daily active with work.      Wants to check testosterone to eval lack of energy.  Past Medical History:   Diagnosis Date   • Anxiety    • Depression    • Insomnia      Past Surgical History:   Procedure Laterality Date   • CHOLECYSTECTOMY     • NO PAST SURGERIES       Family History   Problem Relation Age of Onset   • No Known Problems Mother    • No Known Problems Father    • No Known Problems Sister      Social History     Tobacco Use   Smoking Status Former Smoker   • Packs/day: 0.25   • Types: Cigarettes   • Quit date: 1/27/2018   • Years since quitting: 3.0   Smokeless Tobacco Current User   • Types: Chew   Tobacco Comment    Patient smokes 2-3 cigarettes per day on occasion     No Known Allergies  No current " "outpatient medications on file.    Review of Systems   Constitutional: Negative for chills, fatigue and fever.   HENT: Negative for congestion, ear pain and sinus pressure.    Respiratory: Negative for cough, chest tightness, shortness of breath and wheezing.    Cardiovascular: Positive for palpitations. Negative for chest pain.   Gastrointestinal: Negative for abdominal pain, blood in stool and constipation.   Skin: Negative for color change.   Allergic/Immunologic: Negative for environmental allergies.   Neurological: Negative for dizziness, speech difficulty and headache.   Psychiatric/Behavioral: Negative for decreased concentration. The patient is not nervous/anxious.      /68 (BP Location: Left arm, Patient Position: Sitting, Cuff Size: Large Adult)   Pulse 72   Temp 97.1 °F (36.2 °C) (Temporal)   Ht 177 cm (69.69\")   Wt 97.1 kg (214 lb)   BMI 30.98 kg/m²     Physical Exam  Constitutional:       Appearance: He is well-developed.   HENT:      Head: Normocephalic and atraumatic.      Comments: *wearing mask  Eyes:      Conjunctiva/sclera: Conjunctivae normal.      Pupils: Pupils are equal, round, and reactive to light.   Neck:      Musculoskeletal: Normal range of motion and neck supple.   Cardiovascular:      Rate and Rhythm: Normal rate and regular rhythm.      Pulses: Normal pulses.      Heart sounds: Normal heart sounds.   Pulmonary:      Effort: Pulmonary effort is normal.      Breath sounds: Normal breath sounds.   Musculoskeletal: Normal range of motion.      Right lower leg: No edema.      Left lower leg: No edema.   Skin:     General: Skin is warm and dry.   Neurological:      Mental Status: He is alert and oriented to person, place, and time.   Psychiatric:         Mood and Affect: Mood normal.         Behavior: Behavior normal.         Thought Content: Thought content normal.         Judgment: Judgment normal.           ECG 12 Lead    Date/Time: 1/27/2021 11:33 AM  Performed by: Rajiv" JONO Cevallos  Authorized by: Ban Vance APRN   Comparison: compared with previous ECG from 2/3/2020  Similar to previous ECG  Rhythm: sinus rhythm  Conduction: non-specific intraventricular conduction delay  QRS axis: left  Other findings: left ventricular hypertrophy    Clinical impression: abnormal EKG            Results Review:  I reviewed the patient's new clinical results.    PHQ-9 Total Score: 0    Assessment/Plan:  Diagnoses and all orders for this visit:    1. Chest discomfort (Primary)  Comments:  ekg today similar to last year, will proceed with scheduling echo per cards visit last yr  Orders:  -     ECG 12 Lead  -     CBC & Differential; Future  -     Comprehensive Metabolic Panel; Future  -     Lipid Panel; Future  -     TSH Rfx On Abnormal To Free T4; Future    2. Chronic fatigue  Comments:  due for labs, will return fasting in am  Orders:  -     Testosterone; Future  -     Vitamin B12; Future  -     Vitamin D 25 Hydroxy; Future    3. Substance abuse ([facility])  Comments:  continue visits with therapist Casandra Guillory  Orders:  -     Urine Drug Screen - Urine, Clean Catch; Future    Other orders  -     Fluarix/Fluzone/Afluria Quad>6 Months       Patient Instructions   Continue your visits with Casandra Guillory.  Return for fasting labs  Will proceed with scheduling echo.    Substance Use Disorder  Substance use disorder occurs when a person's repeated use of drugs or alcohol interferes with his or her ability to be productive. This disorder can cause problems with mental and physical health. It can affect your ability to have healthy relationships, and it can keep you from being able to meet your responsibilities at work, home, or school. It can also lead to addiction, which is a condition in which the person cannot stop using the substance consistently for a period of time.  Addiction changes the way the brain works. Because of these changes, addiction is a chronic condition. Substance use  disorder can be mild, moderate, or severe.  The most commonly abused substances include:  · Alcohol.  · Tobacco.  · Marijuana.  · Stimulants, such as cocaine and methamphetamine.  · Hallucinogens, such as LSD and PCP.  · Opioids, such as some prescription pain medicines and heroin.  What are the causes?  This condition may develop due to many complex social, psychological, or physical reasons, such as:  · Stress.  · Abuse.  · Peer pressure.  · Anxiety or depression.  What increases the risk?  This condition is more likely to develop in people who:  · Use substances to cope with stress.  · Have been abused.  · Have a mental health disorder, such as depression.  · Have a family history of substance use disorder.  What are the signs or symptoms?  Symptoms of this condition include:  · Using the substance for longer periods of time or at a higher dosage than what is normal or intended.  · Having a lasting desire to use the substance.  · Being unable to slow down or stop the use of the substance.  · Spending an abnormal amount of time getting the substance, using the substance, or recovering from using the substance.  · Using the substance in a way that interferes with work, school, social activities, and personal relationships.  · Using the substance even after having negative consequences, such as:  ? Health problems.  ? Legal or financial troubles.  ? Job loss.  ? Relationship problems.  · Needing more and more of the substance to get the same effect (developing tolerance).  · Experiencing unpleasant symptoms if you do not use the substance (withdrawal).  · Using the substance to avoid withdrawal symptoms.  How is this diagnosed?  This condition may be diagnosed based on:  · A physical exam.  · Your history of substance use.  · Your symptoms. This includes:  ? How substance use affects your life.  ? Changes in personality, behaviors, and mood.  ? Having at least two symptoms of substance use disorder within a 12-month  period.  ? Health issues related to substance use, such as liver damage, shortness of breath, fatigue, cough, or heart problems.  · Blood or urine tests to screen for alcohol and drugs.  How is this treated?  This condition may be treated by:  · Stopping substance use safely. This may require taking medicines and being closely monitored for several days.  · Taking part in group and individual counseling from mental health providers who help people with substance use disorder.  · Staying at a live-in (residential) treatment center for several days or weeks.  · Attending daily counseling sessions at a treatment center.  · Taking medicine as told by your health care provider:  ? To ease symptoms and prevent complications during withdrawal.  ? To treat other mental health issues, such as depression or anxiety.  ? To block cravings by causing the same effects as the substance.  ? To block the effects of the substance or replace good sensations with unpleasant ones.  · Participating in a support group to share your experience with others who are going through the same thing. These groups are an important part of long-term recovery for many people.  Recovery can be a long process. Many people who undergo treatment start using the substance again after stopping (relapse). If you relapse, that does not mean that treatment will not work.  Follow these instructions at home:    · Take over-the-counter and prescription medicines only as told by your health care provider.  · Do not use any drugs or alcohol.  · Avoid temptations or triggers that you associate with your use of the substance.  · Learn and practice techniques for managing stress.  · Have a plan for vulnerable moments. Get phone numbers of people who are willing to help and who are committed to your recovery.  · Attend support groups on a regular basis. These groups include 12-step programs like Alcoholics Anonymous and Narcotics Anonymous.  · Keep all follow-up  visits as told by your health care providers. This is important. This includes continuing to work with therapists and support groups.  Contact a health care provider if:  · You cannot take your medicines as told.  · Your symptoms get worse.  · You have trouble resisting the urge to use drugs or alcohol.  Get help right away if you:  · Relapse.  · Think that you may have taken too much of a drug. The hotline of the National Poison Control Center is (183) 323-4439.  · Have signs of an overdose. Symptoms include:  ? Chest pain.  ? Confusion.  ? Sleepiness or difficulty staying awake.  ? Slowed breathing.  ? Nausea or vomiting.  ? A seizure.  · Have serious thoughts about hurting yourself or someone else.  Drug overdose is an emergency. Do not wait to see if the symptoms will go away. Get medical help right away. Call your local emergency services (510 in the [place] Do not drive yourself to the hospital.  If you ever feel like you may hurt yourself or others, or have thoughts about taking your own life, get help right away. You can go to your nearest emergency department or call:  · Your local emergency services (510 in the [place]  · A suicide crisis helpline, such as the National Suicide Prevention Lifeline at 1-314.165.8681. This is open 24 hours a day.  Summary  · Substance use disorder occurs when a person's repeated use of drugs or alcohol interferes with his or her ability to be productive.  · Taking part in group and individual counseling from mental health providers is a common treatment for people with substance use disorder.  · Recovery can be a long process. Many people who undergo treatment start using the substance again after stopping (relapse). A relapse does not mean that treatment will not work.  · Attend support groups such as Alcoholics Anonymous and Narcotics Anonymous. These groups are an important part of long-term recovery for many people.  This information is not intended to replace advice given  to you by your health care provider. Make sure you discuss any questions you have with your health care provider.  Document Revised: 04/09/2020 Document Reviewed: 01/29/2019  Elsevier Patient Education © 2020 [employer]      Plan of care reviewed with patient at the conclusion of today's visit. Education was provided regarding diagnosis, management and any prescribed or recommended OTC medications.  Patient verbalizes understanding of and agreement with management plan.    Return in about 6 weeks (around 3/10/2021).    *Note that portions of this note were completed with a voice recognition program.  Efforts were made to edit the dictation but occasionally words are transcribed.    Ban Vance, APRN

## 2021-01-27 NOTE — PROGRESS NOTES
Pt called back and I let him know that he needed to call [facility] at [phone] to schedule the echo.  Pt verbalized and understood the information.

## 2021-01-27 NOTE — PATIENT INSTRUCTIONS
Continue your visits with Casandra Guillory.  Return for fasting labs  Will proceed with scheduling echo.    Substance Use Disorder  Substance use disorder occurs when a person's repeated use of drugs or alcohol interferes with his or her ability to be productive. This disorder can cause problems with mental and physical health. It can affect your ability to have healthy relationships, and it can keep you from being able to meet your responsibilities at work, home, or school. It can also lead to addiction, which is a condition in which the person cannot stop using the substance consistently for a period of time.  Addiction changes the way the brain works. Because of these changes, addiction is a chronic condition. Substance use disorder can be mild, moderate, or severe.  The most commonly abused substances include:  · Alcohol.  · Tobacco.  · Marijuana.  · Stimulants, such as cocaine and methamphetamine.  · Hallucinogens, such as LSD and PCP.  · Opioids, such as some prescription pain medicines and heroin.  What are the causes?  This condition may develop due to many complex social, psychological, or physical reasons, such as:  · Stress.  · Abuse.  · Peer pressure.  · Anxiety or depression.  What increases the risk?  This condition is more likely to develop in people who:  · Use substances to cope with stress.  · Have been abused.  · Have a mental health disorder, such as depression.  · Have a family history of substance use disorder.  What are the signs or symptoms?  Symptoms of this condition include:  · Using the substance for longer periods of time or at a higher dosage than what is normal or intended.  · Having a lasting desire to use the substance.  · Being unable to slow down or stop the use of the substance.  · Spending an abnormal amount of time getting the substance, using the substance, or recovering from using the substance.  · Using the substance in a way that interferes with work, school, social activities,  and personal relationships.  · Using the substance even after having negative consequences, such as:  ? Health problems.  ? Legal or financial troubles.  ? Job loss.  ? Relationship problems.  · Needing more and more of the substance to get the same effect (developing tolerance).  · Experiencing unpleasant symptoms if you do not use the substance (withdrawal).  · Using the substance to avoid withdrawal symptoms.  How is this diagnosed?  This condition may be diagnosed based on:  · A physical exam.  · Your history of substance use.  · Your symptoms. This includes:  ? How substance use affects your life.  ? Changes in personality, behaviors, and mood.  ? Having at least two symptoms of substance use disorder within a 12-month period.  ? Health issues related to substance use, such as liver damage, shortness of breath, fatigue, cough, or heart problems.  · Blood or urine tests to screen for alcohol and drugs.  How is this treated?  This condition may be treated by:  · Stopping substance use safely. This may require taking medicines and being closely monitored for several days.  · Taking part in group and individual counseling from mental health providers who help people with substance use disorder.  · Staying at a live-in (residential) treatment center for several days or weeks.  · Attending daily counseling sessions at a treatment center.  · Taking medicine as told by your health care provider:  ? To ease symptoms and prevent complications during withdrawal.  ? To treat other mental health issues, such as depression or anxiety.  ? To block cravings by causing the same effects as the substance.  ? To block the effects of the substance or replace good sensations with unpleasant ones.  · Participating in a support group to share your experience with others who are going through the same thing. These groups are an important part of long-term recovery for many people.  Recovery can be a long process. Many people who  undergo treatment start using the substance again after stopping (relapse). If you relapse, that does not mean that treatment will not work.  Follow these instructions at home:    · Take over-the-counter and prescription medicines only as told by your health care provider.  · Do not use any drugs or alcohol.  · Avoid temptations or triggers that you associate with your use of the substance.  · Learn and practice techniques for managing stress.  · Have a plan for vulnerable moments. Get phone numbers of people who are willing to help and who are committed to your recovery.  · Attend support groups on a regular basis. These groups include 12-step programs like Alcoholics Anonymous and Narcotics Anonymous.  · Keep all follow-up visits as told by your health care providers. This is important. This includes continuing to work with therapists and support groups.  Contact a health care provider if:  · You cannot take your medicines as told.  · Your symptoms get worse.  · You have trouble resisting the urge to use drugs or alcohol.  Get help right away if you:  · Relapse.  · Think that you may have taken too much of a drug. The hotline of the National Poison Control Center is (254) 418-9954.  · Have signs of an overdose. Symptoms include:  ? Chest pain.  ? Confusion.  ? Sleepiness or difficulty staying awake.  ? Slowed breathing.  ? Nausea or vomiting.  ? A seizure.  · Have serious thoughts about hurting yourself or someone else.  Drug overdose is an emergency. Do not wait to see if the symptoms will go away. Get medical help right away. Call your local emergency services (755 in the [place] Do not drive yourself to the hospital.  If you ever feel like you may hurt yourself or others, or have thoughts about taking your own life, get help right away. You can go to your nearest emergency department or call:  · Your local emergency services (358 in the [place]  · A suicide crisis helpline, such as the National Suicide Prevention  [facility] at 1-502.590.7719. This is open 24 hours a day.  Summary  · Substance use disorder occurs when a person's repeated use of drugs or alcohol interferes with his or her ability to be productive.  · Taking part in group and individual counseling from mental health providers is a common treatment for people with substance use disorder.  · Recovery can be a long process. Many people who undergo treatment start using the substance again after stopping (relapse). A relapse does not mean that treatment will not work.  · Attend support groups such as Alcoholics Anonymous and Narcotics Anonymous. These groups are an important part of long-term recovery for many people.  This information is not intended to replace advice given to you by your health care provider. Make sure you discuss any questions you have with your health care provider.  Document Revised: 04/09/2020 Document Reviewed: 01/29/2019  Elsevier Patient Education © 2020 [employer]

## 2021-01-28 ENCOUNTER — LAB (OUTPATIENT)
Dept: LAB | Facility: HOSPITAL | Age: 51
End: 2021-01-28

## 2021-01-28 ENCOUNTER — TELEPHONE (OUTPATIENT)
Dept: INTERNAL MEDICINE | Facility: CLINIC | Age: 51
End: 2021-01-28

## 2021-01-28 DIAGNOSIS — F19.10 SUBSTANCE ABUSE (HCC): ICD-10-CM

## 2021-01-28 DIAGNOSIS — R07.89 CHEST DISCOMFORT: ICD-10-CM

## 2021-01-28 DIAGNOSIS — R07.89 CHEST DISCOMFORT: Primary | ICD-10-CM

## 2021-01-28 DIAGNOSIS — R53.82 CHRONIC FATIGUE: ICD-10-CM

## 2021-01-28 LAB
ALBUMIN SERPL-MCNC: 4.8 G/DL (ref 3.5–5.2)
ALBUMIN/GLOB SERPL: 2.1 G/DL
ALP SERPL-CCNC: 73 U/L (ref 39–117)
ALT SERPL W P-5'-P-CCNC: 25 U/L (ref 1–41)
AMPHET+METHAMPHET UR QL: NEGATIVE
AMPHETAMINES UR QL: NEGATIVE
ANION GAP SERPL CALCULATED.3IONS-SCNC: 10.2 MMOL/L (ref 5–15)
AST SERPL-CCNC: 25 U/L (ref 1–40)
BARBITURATES UR QL SCN: NEGATIVE
BASOPHILS # BLD AUTO: 0.08 10*3/MM3 (ref 0–0.2)
BASOPHILS NFR BLD AUTO: 1 % (ref 0–1.5)
BENZODIAZ UR QL SCN: NEGATIVE
BILIRUB SERPL-MCNC: 0.5 MG/DL (ref 0–1.2)
BUN SERPL-MCNC: 16 MG/DL (ref 6–20)
BUN/CREAT SERPL: 15.5 (ref 7–25)
BUPRENORPHINE SERPL-MCNC: NEGATIVE NG/ML
CALCIUM SPEC-SCNC: 9.9 MG/DL (ref 8.6–10.5)
CANNABINOIDS SERPL QL: NEGATIVE
CHLORIDE SERPL-SCNC: 102 MMOL/L (ref 98–107)
CHOLEST SERPL-MCNC: 146 MG/DL (ref 0–200)
CO2 SERPL-SCNC: 27.8 MMOL/L (ref 22–29)
COCAINE UR QL: NEGATIVE
CREAT SERPL-MCNC: 1.03 MG/DL (ref 0.76–1.27)
DEPRECATED RDW RBC AUTO: 40 FL (ref 37–54)
EOSINOPHIL # BLD AUTO: 0.18 10*3/MM3 (ref 0–0.4)
EOSINOPHIL NFR BLD AUTO: 2.3 % (ref 0.3–6.2)
ERYTHROCYTE [DISTWIDTH] IN BLOOD BY AUTOMATED COUNT: 12 % (ref 12.3–15.4)
GFR SERPL CREATININE-BSD FRML MDRD: 77 ML/MIN/1.73
GLOBULIN UR ELPH-MCNC: 2.3 GM/DL
GLUCOSE SERPL-MCNC: 92 MG/DL (ref 65–99)
HCT VFR BLD AUTO: 45.8 % (ref 37.5–51)
HDLC SERPL-MCNC: 45 MG/DL (ref 40–60)
HGB BLD-MCNC: 16 G/DL (ref 13–17.7)
IMM GRANULOCYTES # BLD AUTO: 0.03 10*3/MM3 (ref 0–0.05)
IMM GRANULOCYTES NFR BLD AUTO: 0.4 % (ref 0–0.5)
LDLC SERPL CALC-MCNC: 81 MG/DL (ref 0–100)
LDLC/HDLC SERPL: 1.75 {RATIO}
LYMPHOCYTES # BLD AUTO: 1.79 10*3/MM3 (ref 0.7–3.1)
LYMPHOCYTES NFR BLD AUTO: 22.9 % (ref 19.6–45.3)
MCH RBC QN AUTO: 32 PG (ref 26.6–33)
MCHC RBC AUTO-ENTMCNC: 34.9 G/DL (ref 31.5–35.7)
MCV RBC AUTO: 91.6 FL (ref 79–97)
METHADONE UR QL SCN: NEGATIVE
MONOCYTES # BLD AUTO: 0.67 10*3/MM3 (ref 0.1–0.9)
MONOCYTES NFR BLD AUTO: 8.6 % (ref 5–12)
NEUTROPHILS NFR BLD AUTO: 5.07 10*3/MM3 (ref 1.7–7)
NEUTROPHILS NFR BLD AUTO: 64.8 % (ref 42.7–76)
NRBC BLD AUTO-RTO: 0 /100 WBC (ref 0–0.2)
OPIATES UR QL: NEGATIVE
OXYCODONE UR QL SCN: NEGATIVE
PCP UR QL SCN: NEGATIVE
PLATELET # BLD AUTO: 207 10*3/MM3 (ref 140–450)
PMV BLD AUTO: 11.1 FL (ref 6–12)
POTASSIUM SERPL-SCNC: 4.8 MMOL/L (ref 3.5–5.2)
PROPOXYPH UR QL: NEGATIVE
PROT SERPL-MCNC: 7.1 G/DL (ref 6–8.5)
RBC # BLD AUTO: 5 10*6/MM3 (ref 4.14–5.8)
SODIUM SERPL-SCNC: 140 MMOL/L (ref 136–145)
TESTOST SERPL-MCNC: 314 NG/DL (ref 249–836)
TRICYCLICS UR QL SCN: NEGATIVE
TRIGL SERPL-MCNC: 111 MG/DL (ref 0–150)
TSH SERPL DL<=0.05 MIU/L-ACNC: 1.62 UIU/ML (ref 0.27–4.2)
VLDLC SERPL-MCNC: 20 MG/DL (ref 5–40)
WBC # BLD AUTO: 7.82 10*3/MM3 (ref 3.4–10.8)

## 2021-01-28 PROCEDURE — 84403 ASSAY OF TOTAL TESTOSTERONE: CPT

## 2021-01-28 PROCEDURE — 82607 VITAMIN B-12: CPT

## 2021-01-28 PROCEDURE — 85025 COMPLETE CBC W/AUTO DIFF WBC: CPT

## 2021-01-28 PROCEDURE — 80061 LIPID PANEL: CPT

## 2021-01-28 PROCEDURE — 82306 VITAMIN D 25 HYDROXY: CPT

## 2021-01-28 PROCEDURE — 84443 ASSAY THYROID STIM HORMONE: CPT

## 2021-01-28 PROCEDURE — 80306 DRUG TEST PRSMV INSTRMNT: CPT

## 2021-01-28 PROCEDURE — 80053 COMPREHEN METABOLIC PANEL: CPT

## 2021-01-28 NOTE — TELEPHONE ENCOUNTER
Shannon with [facility] called to see if you can put an order in for an ECHO patient called them to schedule he stated you wanted him to schedule the order was from Dr. Waters  The order has  and they need another one.

## 2021-01-29 LAB
25(OH)D3 SERPL-MCNC: 23.8 NG/ML (ref 30–100)
VIT B12 BLD-MCNC: 566 PG/ML (ref 211–946)

## 2021-02-01 ENCOUNTER — TELEPHONE (OUTPATIENT)
Dept: INTERNAL MEDICINE | Facility: CLINIC | Age: 51
End: 2021-02-01

## 2021-02-01 NOTE — TELEPHONE ENCOUNTER
PATIENT CALLED TO MAKE SURE A REFERRAL WAS PUT IN FOR AN ECHO.    ORDER SENT IN 1/29/21.  HE WILL BE NOTIFIED WHEN SCHEDULED.

## 2022-07-12 ENCOUNTER — TELEPHONE (OUTPATIENT)
Dept: INTERNAL MEDICINE | Facility: CLINIC | Age: 52
End: 2022-07-12

## 2022-07-12 NOTE — TELEPHONE ENCOUNTER
Call was transferred to me by Sagar. Patient complains of chest pain that was stabbing with shallow breathing and heart fluttering. Symptoms have been ongoing for 2 days until this morning. He stated his symptoms subsided after taking a boxing class this morning. Patient believes these symptoms are related to weaning off of xanax. He states you prescribed but I can not find it on med list. He said he believes it is xanax 10mg take TID. He has decreased to 15-20mg per day. Patient is currently in [place] and he wanted to make his appointment 2 weeks out so he could get on a right sleep schedule and get off xanax before seeing you. He declined a sooner appointment. Denied any other symptoms. He has not been seen since 01/27/21.